# Patient Record
Sex: MALE | URBAN - METROPOLITAN AREA
[De-identification: names, ages, dates, MRNs, and addresses within clinical notes are randomized per-mention and may not be internally consistent; named-entity substitution may affect disease eponyms.]

---

## 2017-05-15 ENCOUNTER — EMERGENCY (EMERGENCY)
Facility: HOSPITAL | Age: 47
LOS: 1 days | Discharge: HOME | End: 2017-05-15
Attending: EMERGENCY MEDICINE | Admitting: EMERGENCY MEDICINE
Payer: COMMERCIAL

## 2017-05-15 VITALS
DIASTOLIC BLOOD PRESSURE: 99 MMHG | TEMPERATURE: 98 F | HEART RATE: 79 BPM | OXYGEN SATURATION: 100 % | WEIGHT: 315 LBS | SYSTOLIC BLOOD PRESSURE: 162 MMHG | RESPIRATION RATE: 20 BRPM

## 2017-05-15 PROCEDURE — 99284 EMERGENCY DEPT VISIT MOD MDM: CPT

## 2017-05-15 NOTE — ED PROVIDER NOTE - OBJECTIVE STATEMENT
47 yr old male with hx of DM who presents for a few hours of bleeding from right ankle varicose vein.  Old wound in area and has had bleeding from vein before.  Applied gauze and duct tape and came to ED.

## 2017-05-15 NOTE — ED PROVIDER NOTE - PROGRESS NOTE DETAILS
applied pressure for 10 minutes and bleeding stopped; applied surgicel and applied gauze; pt will follow up with his vascular physician